# Patient Record
(demographics unavailable — no encounter records)

---

## 2024-11-07 NOTE — CONSULT LETTER
[Dear  ___] : Dear  [unfilled], [Consult Letter:] : I had the pleasure of evaluating your patient, [unfilled]. [Please see my note below.] : Please see my note below. [Consult Closing:] : Thank you very much for allowing me to participate in the care of this patient.  If you have any questions, please do not hesitate to contact me. [Sincerely,] : Sincerely, [FreeTextEntry2] : Dr. Freida Doe

## 2024-11-07 NOTE — HISTORY OF PRESENT ILLNESS
[de-identified] : This is a 3-year-old male with ASD presenting with mother for an initial consultation.  There is a history of eczematous rashes x 1 year. Every 2 months patient gets a flare ups. Sometimes there is associated skin pruritus. Does not take any medications for this. Has tried eliminating gluten which has not helped. Now eating gluten. Ok with milk, eggs, wheat, soy, sesame, peanut, almond, fish/shellfish.  During Spring and Fall, patient gets itchy/watery eyes, runny nose and post-nasal drip. Takes benadryl with good relief. No antihistamines in the past 1 week.  No asthma. No food or medication allergies. 2 dogs at home.

## 2024-11-07 NOTE — HISTORY OF PRESENT ILLNESS
[de-identified] : This is a 3-year-old male with ASD presenting with mother for an initial consultation.  There is a history of eczematous rashes x 1 year. Every 2 months patient gets a flare ups. Sometimes there is associated skin pruritus. Does not take any medications for this. Has tried eliminating gluten which has not helped. Now eating gluten. Ok with milk, eggs, wheat, soy, sesame, peanut, almond, fish/shellfish.  During Spring and Fall, patient gets itchy/watery eyes, runny nose and post-nasal drip. Takes benadryl with good relief. No antihistamines in the past 1 week.  No asthma. No food or medication allergies. 2 dogs at home.

## 2024-11-07 NOTE — END OF VISIT
[FreeTextEntry3] : TIMOTHY Carlton has acted like a scribe on my behalf. I have reviewed the note and edited where appropriate. History, PE, assessment, and plan were personally performed by me.